# Patient Record
(demographics unavailable — no encounter records)

---

## 2024-10-10 NOTE — REVIEW OF SYSTEMS
[Fatigue: Grade 0] : Fatigue: Grade 0 [Breast Pain: Grade 0] : Breast Pain: Grade 0 [Skin Hyperpigmentation: Grade 0] : Skin Hyperpigmentation: Grade 0 [Dermatitis Radiation: Grade 0] : Dermatitis Radiation: Grade 0

## 2024-10-15 NOTE — HISTORY OF PRESENT ILLNESS
[FreeTextEntry1] : Ms. VELIZ is a 62-year-old woman with Stage IB Right breast IDC (ER+) who underwent surgery followed by chemotherapy and adjuvant radiotherapy.  Of note, she was on tamoxifen for 5 years for history of ADH of the right breast diagnosed in 2009.  History of Present Illness:  She initially presented after noticing a lump on her right breast in March of 2020. Ultrasound (4/20/20) noted an irregular solid mass.  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 ER/VA positive, Her2 negative.   Due to COVID-19 pandemic, she was started on Anastrozole until surgery was again available.   6/16/20 - Right lumpectomy. Final pathology: Invasive ductal carcinoma, will differentiated. Right axillary sentinel lymph nodes, 3 lymph nodes negative for tumor. (Residual 5mm primary disease, and micrometastatic disease in one lymph node, itc in one lymph node and no involvement of the third sampled node.)  According to Dr. Castro's note, Oncotype was 12 but "cannot be used by itself in this pretreated patient to make a decision". She completed 4 cycles of TC chemotherapy and resumed anastrozole.   10/03/23: Today, feeling well with no new complaints. Denies any joint pain or night sweats. Continues to follow up with Dr. Castro and Dr. Gallegos. Patient has an irregular heartbeat that is being followed by cardiology, and was recently started on Metoprolol.  Per Dr. Castro"s note from July 5.2023 Ca15.3 and cea normal in Jan,2023 and mammo and breast son from May, 2023 unremarkable.  10/10/24: She presents today for routine follow-up. Continues follow-up with Flor Castro and El. Remains on anastrozole. Had mammo 5/29/2024. She still has pain/numbness in her axilla since her surgery. Denied edema

## 2024-10-15 NOTE — HISTORY OF PRESENT ILLNESS
[FreeTextEntry1] : Ms. VELIZ is a 62-year-old woman with Stage IB Right breast IDC (ER+) who underwent surgery followed by chemotherapy and adjuvant radiotherapy.  Of note, she was on tamoxifen for 5 years for history of ADH of the right breast diagnosed in 2009.  History of Present Illness:  She initially presented after noticing a lump on her right breast in March of 2020. Ultrasound (4/20/20) noted an irregular solid mass.  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 ER/MA positive, Her2 negative.   Due to COVID-19 pandemic, she was started on Anastrozole until surgery was again available.   6/16/20 - Right lumpectomy. Final pathology: Invasive ductal carcinoma, will differentiated. Right axillary sentinel lymph nodes, 3 lymph nodes negative for tumor. (Residual 5mm primary disease, and micrometastatic disease in one lymph node, itc in one lymph node and no involvement of the third sampled node.)  According to Dr. Castro's note, Oncotype was 12 but "cannot be used by itself in this pretreated patient to make a decision". She completed 4 cycles of TC chemotherapy and resumed anastrozole.   10/03/23: Today, feeling well with no new complaints. Denies any joint pain or night sweats. Continues to follow up with Dr. Castro and Dr. Gallegos. Patient has an irregular heartbeat that is being followed by cardiology, and was recently started on Metoprolol.  Per Dr. Castro"s note from July 5.2023 Ca15.3 and cea normal in Jan,2023 and mammo and breast son from May, 2023 unremarkable.  10/10/24: She presents today for routine follow-up. Continues follow-up with Flor Castro and El. Remains on anastrozole. Had mammo 5/29/2024. She still has pain/numbness in her axilla since her surgery. Denied edema

## 2024-10-15 NOTE — HISTORY OF PRESENT ILLNESS
[FreeTextEntry1] : Ms. VELIZ is a 62-year-old woman with Stage IB Right breast IDC (ER+) who underwent surgery followed by chemotherapy and adjuvant radiotherapy.  Of note, she was on tamoxifen for 5 years for history of ADH of the right breast diagnosed in 2009.  History of Present Illness:  She initially presented after noticing a lump on her right breast in March of 2020. Ultrasound (4/20/20) noted an irregular solid mass.  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 ER/MS positive, Her2 negative.   Due to COVID-19 pandemic, she was started on Anastrozole until surgery was again available.   6/16/20 - Right lumpectomy. Final pathology: Invasive ductal carcinoma, will differentiated. Right axillary sentinel lymph nodes, 3 lymph nodes negative for tumor. (Residual 5mm primary disease, and micrometastatic disease in one lymph node, itc in one lymph node and no involvement of the third sampled node.)  According to Dr. Castro's note, Oncotype was 12 but "cannot be used by itself in this pretreated patient to make a decision". She completed 4 cycles of TC chemotherapy and resumed anastrozole.   10/03/23: Today, feeling well with no new complaints. Denies any joint pain or night sweats. Continues to follow up with Dr. Castro and Dr. Gallegos. Patient has an irregular heartbeat that is being followed by cardiology, and was recently started on Metoprolol.  Per Dr. Castro"s note from July 5.2023 Ca15.3 and cea normal in Jan,2023 and mammo and breast son from May, 2023 unremarkable.  10/10/24: She presents today for routine follow-up. Continues follow-up with Flor Castro and El. Remains on anastrozole. Had mammo 5/29/2024. She still has pain/numbness in her axilla since her surgery. Denied edema

## 2024-10-15 NOTE — PHYSICAL EXAM
[Sclera] : the sclera and conjunctiva were normal [PERRL With Normal Accommodation] : pupils were equal in size, round, reactive to light [Extraocular Movements] : extraocular movements were intact [] : no respiratory distress [Respiration, Rhythm And Depth] : normal respiratory rhythm and effort [Exaggerated Use Of Accessory Muscles For Inspiration] : no accessory muscle use [Breast Appearance] : normal in appearance [Breast Palpation Mass] : no palpable masses [Breast Abnormal Lactation (Galactorrhea)] : no nipple discharge [Normal] : oriented to person, place and time, the affect was normal, the mood was normal and not anxious [de-identified] : R breast with well healed incision, mild contraction of the breast as compared to left

## 2024-10-15 NOTE — PHYSICAL EXAM
[Sclera] : the sclera and conjunctiva were normal [PERRL With Normal Accommodation] : pupils were equal in size, round, reactive to light [Extraocular Movements] : extraocular movements were intact [] : no respiratory distress [Respiration, Rhythm And Depth] : normal respiratory rhythm and effort [Exaggerated Use Of Accessory Muscles For Inspiration] : no accessory muscle use [Breast Appearance] : normal in appearance [Breast Palpation Mass] : no palpable masses [Breast Abnormal Lactation (Galactorrhea)] : no nipple discharge [Normal] : oriented to person, place and time, the affect was normal, the mood was normal and not anxious [de-identified] : R breast with well healed incision, mild contraction of the breast as compared to left

## 2024-10-15 NOTE — PHYSICAL EXAM
[Sclera] : the sclera and conjunctiva were normal [PERRL With Normal Accommodation] : pupils were equal in size, round, reactive to light [Extraocular Movements] : extraocular movements were intact [] : no respiratory distress [Respiration, Rhythm And Depth] : normal respiratory rhythm and effort [Exaggerated Use Of Accessory Muscles For Inspiration] : no accessory muscle use [Breast Appearance] : normal in appearance [Breast Palpation Mass] : no palpable masses [Breast Abnormal Lactation (Galactorrhea)] : no nipple discharge [Normal] : oriented to person, place and time, the affect was normal, the mood was normal and not anxious [de-identified] : R breast with well healed incision, mild contraction of the breast as compared to left